# Patient Record
Sex: MALE | Employment: FULL TIME | URBAN - METROPOLITAN AREA
[De-identification: names, ages, dates, MRNs, and addresses within clinical notes are randomized per-mention and may not be internally consistent; named-entity substitution may affect disease eponyms.]

---

## 2019-11-13 ENCOUNTER — APPOINTMENT (OUTPATIENT)
Dept: GENERAL RADIOLOGY | Facility: CLINIC | Age: 49
End: 2019-11-13
Attending: EMERGENCY MEDICINE
Payer: OTHER MISCELLANEOUS

## 2019-11-13 ENCOUNTER — HOSPITAL ENCOUNTER (EMERGENCY)
Facility: CLINIC | Age: 49
Discharge: HOME | End: 2019-11-13
Attending: EMERGENCY MEDICINE | Admitting: EMERGENCY MEDICINE
Payer: OTHER MISCELLANEOUS

## 2019-11-13 VITALS
WEIGHT: 278 LBS | HEART RATE: 78 BPM | HEIGHT: 70 IN | RESPIRATION RATE: 22 BRPM | DIASTOLIC BLOOD PRESSURE: 89 MMHG | BODY MASS INDEX: 39.8 KG/M2 | SYSTOLIC BLOOD PRESSURE: 131 MMHG | TEMPERATURE: 97.9 F | OXYGEN SATURATION: 95 %

## 2019-11-13 DIAGNOSIS — S43.015A ANTERIOR SHOULDER DISLOCATION, LEFT, INITIAL ENCOUNTER: ICD-10-CM

## 2019-11-13 PROCEDURE — 96375 TX/PRO/DX INJ NEW DRUG ADDON: CPT

## 2019-11-13 PROCEDURE — 23650 CLTX SHO DSLC W/MNPJ WO ANES: CPT | Mod: LT

## 2019-11-13 PROCEDURE — 73030 X-RAY EXAM OF SHOULDER: CPT | Mod: LT

## 2019-11-13 PROCEDURE — 40000986 XR SHOULDER 2 VIEW LEFT: Mod: LT

## 2019-11-13 PROCEDURE — 25000128 H RX IP 250 OP 636

## 2019-11-13 PROCEDURE — 96374 THER/PROPH/DIAG INJ IV PUSH: CPT

## 2019-11-13 PROCEDURE — 99285 EMERGENCY DEPT VISIT HI MDM: CPT | Mod: 25

## 2019-11-13 PROCEDURE — 25000128 H RX IP 250 OP 636: Performed by: EMERGENCY MEDICINE

## 2019-11-13 RX ORDER — FENTANYL CITRATE 50 UG/ML
100 INJECTION, SOLUTION INTRAMUSCULAR; INTRAVENOUS ONCE
Status: COMPLETED | OUTPATIENT
Start: 2019-11-13 | End: 2019-11-13

## 2019-11-13 RX ORDER — MORPHINE SULFATE 2 MG/ML
4 INJECTION, SOLUTION INTRAMUSCULAR; INTRAVENOUS ONCE
Status: COMPLETED | OUTPATIENT
Start: 2019-11-13 | End: 2019-11-13

## 2019-11-13 RX ORDER — FENTANYL CITRATE 50 UG/ML
INJECTION, SOLUTION INTRAMUSCULAR; INTRAVENOUS
Status: COMPLETED
Start: 2019-11-13 | End: 2019-11-13

## 2019-11-13 RX ADMIN — FENTANYL CITRATE 100 MCG: 50 INJECTION, SOLUTION INTRAMUSCULAR; INTRAVENOUS at 20:09

## 2019-11-13 RX ADMIN — MORPHINE SULFATE 4 MG: 2 INJECTION, SOLUTION INTRAMUSCULAR; INTRAVENOUS at 19:35

## 2019-11-13 ASSESSMENT — MIFFLIN-ST. JEOR: SCORE: 2127.25

## 2019-11-13 ASSESSMENT — ENCOUNTER SYMPTOMS: ABDOMINAL PAIN: 0

## 2019-11-13 NOTE — ED AVS SNAPSHOT
Emergency Department  [address] [facility] [address]  Phone:  [phone]  Fax:  [phone]                                    Akil Mcneil   MRN: [id number]    Department:   Emergency Department   Date of Visit:  11/13/2019           After Visit Summary Signature Page    I have received my discharge instructions, and my questions have been answered. I have discussed any challenges I see with this plan with the nurse or doctor.    ..........................................................................................................................................  Patient/Patient Representative Signature      ..........................................................................................................................................  Patient Representative Print Name and Relationship to Patient    ..................................................               ................................................  Date                                   Time    ..........................................................................................................................................  Reviewed by Signature/Title    ...................................................              ..............................................  Date                                               Time          22EPIC Rev 08/18

## 2019-11-13 NOTE — LETTER
November 13, 2019      To Whom It May Concern:      Akil Mcneil was seen in our Emergency Department today, 11/13/19.  I expect his condition to improve over the next 3 days.  He may return to work/school when improved.    Sincerely,        Parviz Will, DO

## 2019-11-14 NOTE — DISCHARGE INSTRUCTIONS
Return to the emergency department or seek medical care as instructed if your symptoms fail to improve or significantly worsen.    Take Acetaminophen (aka Tylenol) and/or ibuprofen (aka Motrin/Advil) as needed for symptom/pain relief; use as directed.    Ice area of pain for 20 minutes four times per day for the next two days    Remain in shoulder immobilizer until follow-up with orthopedics.    Follow-up as indicated on page 1.  Maintain adequate hydration and get plenty of rest.

## 2019-11-14 NOTE — ED NOTES
Bed: ED22  Expected date: 11/13/19  Expected time: 7:10 PM  Means of arrival: Ambulance  Comments:  [address] [age]M fall; arm pain

## 2019-11-14 NOTE — ED PROVIDER NOTES
"  History   Chief Complaint:  Fall    HPI   Akil Mcneil is a 49 year old male (right-handed) with a history of diabetes who presents via EMS for evaluation after a fall. 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 He states that he reached his left arm out to grab the tug but ended up hitting his left arm above the elbow on the boarding compartment of the tug. He immediately developed 10/10 pain in the arm but did not lose consciousness, hit his head, or sustain any other injuries. EMS was contacted and the patient was brought to the ED. He denies abdominal pain and leg pain.    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   Smoking status: Never smoker  Alcohol use: Yes  Drug use: No  Marital Status:   [2]     Review of Systems   Gastrointestinal: Negative for abdominal pain.   Musculoskeletal:        Positive for left upper arm pain. Negative for leg pain.   All other systems reviewed and are negative.      Physical Exam   Patient Vitals for the past 24 hrs:   BP Temp Temp src Pulse Resp SpO2 Height Weight   11/13/19 2117 -- -- -- -- -- 95 % -- --   11/13/19 2115 131/89 -- -- 78 -- -- -- --   11/13/19 1932 (!) 149/93 97.9  F (36.6  C) Oral 87 22 100 % 1.778 m (5' 10\") 126.1 kg (278 lb)     Physical Exam  General: Patient in mild distress.  Alert and cooperative with exam. Normal mentation  HEENT: NC/AT. Conjunctiva without injection or scleral icterus. External ears normal.  Respiratory: Breathing comfortably on room air  CV: Normal rate, all extremities well perfused  GI:  Non-distended abdomen. Obese  Skin: Warm, dry, no rashes/open wounds on " "exposed skin  Musculoskeletal: LUE: CMS intact. Significant pain with ROM of the shoulder, elbow and wrist exam normal. Exam limited by body habitus, swelling present over anterior shoulder.  Neuro: Alert, answers questions appropriately. No gross motor deficits    Emergency Department Course   Imaging:  Radiographic findings were communicated with the patient who voiced understanding of the findings.    XR Shoulder Left 2 Views (Post-Reduction):  I suspect that the previously seen humeral head  dislocation is reduced, although the transscapular Y view is  suboptimal. If indicated clinically, this view could be repeated, or  an axillary view could be performed. Hill-Sachs lesion. I also suspect  a mild thin bony Bankart lesion.  As read by Radiology.    XR Shoulder Left G/E 3 Views (Pre-Reduction):  Anterior-inferior dislocation of the humeral head. There  appears to be a Hill-Sachs lesion.  As read by Radiology.    Procedures:     Shoulder Reduction       Site: Left shoulder; anterior inferior dislocation      Procedure Provider: Dr. Will      Reduction Comments: The patient's left upper extremitiy was held in traction and with the elbow flexed to 90 degrees was externally rotated until a \"pop\" was felt. The patient's LUE then appeared reduced with normal ROM and significant pain reduction. Post reductions x-rays were obtained and showed good reduction. Placed in shoulder immobilizer.      Post-Procedure Assessment Note: Dislocation alignment improved post procedure. Sensation and circulation are intact. Patient placed in shoulder immobilizer.      Complications: None    Interventions:  1935: morphine 4 mg IV  2009: fentanyl 100 mcg IV    Emergency Department Course:  The patient presents to the ED via EMS.     Past medical records, nursing notes, and vitals reviewed.   1938: I performed an exam of the patient and obtained history, as documented above.    IV inserted and blood drawn. The patient was sent for a " left shoulder x-ray while in the emergency department, findings above.    2005: I rechecked the patient. I reduced the patient's shoulder as noted above.    The patient was sent for a repeat left shoulder x-ray while in the emergency department, findings above.    2045: I rechecked the patient. Explained findings to patient.    Findings and plan explained to the patient. Patient discharged home with instructions regarding supportive care, medications, and reasons to return. The importance of close follow-up was reviewed.     Impression & Plan    Medical Decision Making:  Akil Mcneil is a 49 year old male who presents for evaluation of shoulder pain after a fall.  This is consistent by clinical and radiological exam with a shoulder dislocation. The dislocation was successfully reduced and a shoulder immobilizer was placed by myself, the fit was checked.  The neurovascular exam is normal pre- and post-reduction.  I will have patient stay in the immobilizer until follow-up with orthopedics in 3-5 days.  The head to toe trauma exam is otherwise normal and I doubt serious underlying spinal, intracerebral, chest, abdominal, pelvic or extremity trauma.  Patient discharged to home with instructions to use Tylenol/ibuprofen for pain.    Diagnosis:    ICD-10-CM    1. Anterior shoulder dislocation, left, initial encounter S43.015A        Disposition:  Discharged to home        Aldo Bonilla  11/13/2019    EMERGENCY DEPARTMENT  I, Aldo Bonilla, am serving as a scribe at 7:38 PM on 11/13/2019 to document services personally performed by Parviz Will DO based on my observations and the provider's statements to me.        Parviz Will DO  11/14/19 1243

## 2020-02-24 ENCOUNTER — HEALTH MAINTENANCE LETTER (OUTPATIENT)
Age: 50
End: 2020-02-24

## 2020-12-13 ENCOUNTER — HEALTH MAINTENANCE LETTER (OUTPATIENT)
Age: 50
End: 2020-12-13

## 2021-04-17 ENCOUNTER — HEALTH MAINTENANCE LETTER (OUTPATIENT)
Age: 51
End: 2021-04-17

## 2021-09-26 ENCOUNTER — HEALTH MAINTENANCE LETTER (OUTPATIENT)
Age: 51
End: 2021-09-26

## 2022-05-08 ENCOUNTER — HEALTH MAINTENANCE LETTER (OUTPATIENT)
Age: 52
End: 2022-05-08

## 2023-01-14 ENCOUNTER — HEALTH MAINTENANCE LETTER (OUTPATIENT)
Age: 53
End: 2023-01-14

## 2023-06-02 ENCOUNTER — HEALTH MAINTENANCE LETTER (OUTPATIENT)
Age: 53
End: 2023-06-02